# Patient Record
(demographics unavailable — no encounter records)

---

## 2025-04-02 NOTE — PHYSICAL EXAM
[de-identified] : There is swelling and tenderness localized over the RCL and volar plate of the right fifth digit without evidence of instability.  There were no masses palpable.  The FDS and FDP are intact bilaterally.  There is no evidence of joint or tendon instability. Negative Ben test.  Range of motion of the right fifth digit is 0/90 of the MP 15/60 of the PIP and 0/90 of the DIP joint with soft endpoints and active equaling passive range of motion.  There is good capillary refill of the digits bilaterally.There is no masses or sensitivity over the median and ulnar nerves at the level of the wrist. There is a negative Tinel's and negative Phalen's sign bilaterally. The sensation is grossly intact bilaterally. [de-identified] : PA lateral and oblique of the right fifth digit shows some small volar plate avulsion type fractures with congruent joint.  PA of both hands shows symmetric joint spaces bilaterally without evidence of arthritis or calcifications.

## 2025-04-02 NOTE — REVIEW OF SYSTEMS
[Left] : left [Arthralgia] : arthralgia [Joint Pain] : joint pain [Joint Stiffness] : joint stiffness [Negative] : Allergic/Immunologic

## 2025-04-02 NOTE — HISTORY OF PRESENT ILLNESS
[Left] : left hand dominant [FreeTextEntry1] : Date of injury: February 26, 2025  Patient is 5 weeks status post fractures of right fifth digit sustained while playing football.  Presents in a kip strap night MD gave him.

## 2025-04-02 NOTE — ASSESSMENT
[FreeTextEntry1] : 5 weeks status post right fifth volar plate avulsion fractures and RCL tear without evidence of clinical instability but residual significant swelling and stiffness despite immediate range of motion with kip straps.  The risks, benefits, alternatives and associated differential diagnosis was discussed with the patient. Options ranged from conservative care, therapy to surgical intervention were reviewed and all questions answered. Risks included incomplete resolution of symptoms, worsening of symptoms recurrence, tissue loss, functional loss and other risks associated with treatment of this condition Patient appeared to have an excellent understanding of the risks as well as differential diagnosis associated with this condition.  He elected to proceed with formal therapy.  A prescription was provided as well as a home program outlined.  The patient elected holistic medicines such as topical Arnica cream and oral maco. Intermittent anti-inflammatories were also discussed and the risk associated with both holistic and traditional medicines were discussed and all questions answered.  Return to the office in 4 weeks to assess the degree of success of conservative management.  If significant improvement is not noted may consider injection.

## 2025-04-16 NOTE — PHYSICAL EXAM
[de-identified] : There is swelling ecchymosis and tenderness localized over the left fourth distal phalanx with pulp soft.  The flexor and extensor mechanism is intact with near full range of motion of the digit with active equaling passive range of motion. There is no evidence of a subungual hematoma.  There is residual swelling of the right fifth PIP joint without evidence of instability. Range of motion 0/90 of the MP 0/100 of the PIP 0/80 of the DIP joint with active equaling passive range of motion no evidence of crepitus or instability.  Flexor extensor mechanism intact.  Good capillary refill negative Tinel's sensation grossly intact [de-identified] : PA lateral oblique of the left fourth digit shows a nondisplaced left fourth distal phalanx fracture extending into the tuft.  The articular surface is congruent. syncope

## 2025-04-16 NOTE — ASSESSMENT
[FreeTextEntry1] : 3 days status post left fourth distal phalanx tuft fracture.  Options were discussed ranging from conservative to more aggressive forms of treatment.  A home program was outlined to begin immediate mobilization of the fracture of the left fourth distal phalanx fracture to reduce the risk of stiffness but a home program was outlined to reduce the risk of displacement.  Patient's right fifth digit appears to be responding well to therapy with significant improvement in range of motion.  He will continue with the therapist for the right hand and transition to independent home program when patient and therapist feel they are ready.  Return to the office in 3 to 4 weeks for reevaluation earlier if there are issues or concerns discussed.

## 2025-05-14 NOTE — ASSESSMENT
[FreeTextEntry1] : 2-1/2 months status post left fifth PIP RCL and volar plate avulsion fracture with restored motion.  5 weeks status post left fourth distal phalanx fracture with clinical and radiographic evidence of union.  A home program was outlined.  Return to the office on an as-needed basis.

## 2025-05-14 NOTE — PHYSICAL EXAM
[de-identified] : The right fifth digit has residual swelling and thickening over the radial collateral ligament of the PIP joint no tenderness of the MP or DIP joint no evidence of joint instability.  Range of motion of the right fifth digit 0/90 of the MP 0/110 of the PIP 0/90 of the DIP joint.  This is equivalent to the other digits with active equaling passive range of motion no tenderness over the A1 pulley.  There is no tenderness over the left fourth digit with a subungual hematoma involving 10% of the nailbed.  The subungual hematoma is 4 mm proximal to the distal nail.  No evidence of active infection.  Full range of motion of the digit with active equaling passive range of motion [de-identified] :   PA lateral and oblique of the left fourth digit shows excellent alignment of the previous left fourth distal phalanx fracture.

## 2025-05-14 NOTE — HISTORY OF PRESENT ILLNESS
[Right] : right hand dominant [FreeTextEntry1] : Date injury: April 13, 2024  Patient is 1 month post left fourth distal phalanx fracture being maintained in the splint.  Date injury: February 26, 2025  Patient is also 2-1/2 months status post right fifth volar plate avulsion fracture and RCL tear